# Patient Record
Sex: MALE | Race: BLACK OR AFRICAN AMERICAN | NOT HISPANIC OR LATINO | URBAN - METROPOLITAN AREA
[De-identification: names, ages, dates, MRNs, and addresses within clinical notes are randomized per-mention and may not be internally consistent; named-entity substitution may affect disease eponyms.]

---

## 2017-02-13 ENCOUNTER — HOSPITAL ENCOUNTER (EMERGENCY)
Facility: HOSPITAL | Age: 27
Discharge: HOME | End: 2017-02-13
Attending: EMERGENCY MEDICINE | Admitting: EMERGENCY MEDICINE

## 2017-02-13 ENCOUNTER — APPOINTMENT (OUTPATIENT)
Dept: GENERAL RADIOLOGY | Facility: HOSPITAL | Age: 27
End: 2017-02-13

## 2017-02-13 VITALS
HEART RATE: 74 BPM | TEMPERATURE: 97.6 F | RESPIRATION RATE: 16 BRPM | HEIGHT: 65 IN | OXYGEN SATURATION: 100 % | SYSTOLIC BLOOD PRESSURE: 134 MMHG | BODY MASS INDEX: 25.83 KG/M2 | WEIGHT: 155 LBS | DIASTOLIC BLOOD PRESSURE: 66 MMHG

## 2017-02-13 DIAGNOSIS — R07.89 ATYPICAL CHEST PAIN: Primary | ICD-10-CM

## 2017-02-13 LAB
ALBUMIN SERPL-MCNC: 4.3 G/DL (ref 3.5–5.2)
ALBUMIN/GLOB SERPL: 1.2 G/DL
ALP SERPL-CCNC: 111 U/L (ref 39–117)
ALT SERPL W P-5'-P-CCNC: 45 U/L (ref 1–41)
ANION GAP SERPL CALCULATED.3IONS-SCNC: 13.3 MMOL/L
AST SERPL-CCNC: 22 U/L (ref 1–40)
BASOPHILS # BLD AUTO: 0.01 10*3/MM3 (ref 0–0.2)
BASOPHILS NFR BLD AUTO: 0.2 % (ref 0–1.5)
BILIRUB SERPL-MCNC: 2.5 MG/DL (ref 0.1–1.2)
BUN BLD-MCNC: 14 MG/DL (ref 6–20)
BUN/CREAT SERPL: 9.7 (ref 7–25)
CALCIUM SPEC-SCNC: 9.6 MG/DL (ref 8.6–10.5)
CHLORIDE SERPL-SCNC: 101 MMOL/L (ref 98–107)
CO2 SERPL-SCNC: 25.7 MMOL/L (ref 22–29)
CREAT BLD-MCNC: 1.44 MG/DL (ref 0.76–1.27)
DEPRECATED RDW RBC AUTO: 38.6 FL (ref 37–54)
EOSINOPHIL # BLD AUTO: 0.01 10*3/MM3 (ref 0–0.7)
EOSINOPHIL NFR BLD AUTO: 0.2 % (ref 0.3–6.2)
ERYTHROCYTE [DISTWIDTH] IN BLOOD BY AUTOMATED COUNT: 12 % (ref 11.5–14.5)
GFR SERPL CREATININE-BSD FRML MDRD: 73 ML/MIN/1.73
GLOBULIN UR ELPH-MCNC: 3.5 GM/DL
GLUCOSE BLD-MCNC: 88 MG/DL (ref 65–99)
HCT VFR BLD AUTO: 44.6 % (ref 40.4–52.2)
HGB BLD-MCNC: 15.1 G/DL (ref 13.7–17.6)
IMM GRANULOCYTES # BLD: 0 10*3/MM3 (ref 0–0.03)
IMM GRANULOCYTES NFR BLD: 0 % (ref 0–0.5)
LIPASE SERPL-CCNC: 18 U/L (ref 13–60)
LYMPHOCYTES # BLD AUTO: 1.15 10*3/MM3 (ref 0.9–4.8)
LYMPHOCYTES NFR BLD AUTO: 23.4 % (ref 19.6–45.3)
MCH RBC QN AUTO: 29.3 PG (ref 27–32.7)
MCHC RBC AUTO-ENTMCNC: 33.9 G/DL (ref 32.6–36.4)
MCV RBC AUTO: 86.4 FL (ref 79.8–96.2)
MONOCYTES # BLD AUTO: 0.3 10*3/MM3 (ref 0.2–1.2)
MONOCYTES NFR BLD AUTO: 6.1 % (ref 5–12)
NEUTROPHILS # BLD AUTO: 3.45 10*3/MM3 (ref 1.9–8.1)
NEUTROPHILS NFR BLD AUTO: 70.1 % (ref 42.7–76)
PLATELET # BLD AUTO: 238 10*3/MM3 (ref 140–500)
PMV BLD AUTO: 10.2 FL (ref 6–12)
POTASSIUM BLD-SCNC: 3.9 MMOL/L (ref 3.5–5.2)
PROT SERPL-MCNC: 7.8 G/DL (ref 6–8.5)
RBC # BLD AUTO: 5.16 10*6/MM3 (ref 4.6–6)
SODIUM BLD-SCNC: 140 MMOL/L (ref 136–145)
TROPONIN T SERPL-MCNC: <0.01 NG/ML (ref 0–0.03)
WBC NRBC COR # BLD: 4.92 10*3/MM3 (ref 4.5–10.7)

## 2017-02-13 PROCEDURE — 83690 ASSAY OF LIPASE: CPT | Performed by: EMERGENCY MEDICINE

## 2017-02-13 PROCEDURE — 99283 EMERGENCY DEPT VISIT LOW MDM: CPT

## 2017-02-13 PROCEDURE — 85025 COMPLETE CBC W/AUTO DIFF WBC: CPT | Performed by: EMERGENCY MEDICINE

## 2017-02-13 PROCEDURE — 71020 HC CHEST PA AND LATERAL: CPT

## 2017-02-13 PROCEDURE — 93005 ELECTROCARDIOGRAM TRACING: CPT | Performed by: EMERGENCY MEDICINE

## 2017-02-13 PROCEDURE — 93010 ELECTROCARDIOGRAM REPORT: CPT | Performed by: INTERNAL MEDICINE

## 2017-02-13 PROCEDURE — 84484 ASSAY OF TROPONIN QUANT: CPT | Performed by: EMERGENCY MEDICINE

## 2017-02-13 PROCEDURE — 80053 COMPREHEN METABOLIC PANEL: CPT | Performed by: EMERGENCY MEDICINE

## 2017-02-13 RX ORDER — NAPROXEN 500 MG/1
500 TABLET ORAL 2 TIMES DAILY PRN
Qty: 20 TABLET | Refills: 0 | Status: SHIPPED | OUTPATIENT
Start: 2017-02-13 | End: 2022-05-31

## 2017-02-13 NOTE — ED PROVIDER NOTES
" EMERGENCY DEPARTMENT ENCOUNTER    CHIEF COMPLAINT  Chief Complaint: Chest Pain  History given by: Patient  History limited by: N/A  Room Number: 18/18  PMD: No Known Provider      HPI:  Pt is a 26 y.o. male who presents complaining of chest pain onset 0845 today. Pt states that the pain does not radiate. Pt describes the pain as \"pressure\". Pt reports SOA, epigastric abdominal pain, and nausea. Pt denies vomiting. Pt states that nothing worsens his pain or betters it. Pt denies any trauma.    Duration:  3 hours  Onset: Gradual  Timing: Constant  Location: Chest  Radiation: Does not radiate  Quality: \"pain\"  Intensity/Severity: Moderate  Progression: Worsening  Associated Symptoms: SOA, abdominal pain, nausea  Aggravating Factors: None  Alleviating Factors: None  Previous Episodes: None  Treatment before arrival: None    PAST MEDICAL HISTORY  Active Ambulatory Problems     Diagnosis Date Noted   • No Active Ambulatory Problems     Resolved Ambulatory Problems     Diagnosis Date Noted   • No Resolved Ambulatory Problems     No Additional Past Medical History       PAST SURGICAL HISTORY  History reviewed. No pertinent past surgical history.    FAMILY HISTORY  History reviewed. No pertinent family history.    SOCIAL HISTORY  Social History     Social History   • Marital status: Single     Spouse name: N/A   • Number of children: N/A   • Years of education: N/A     Occupational History   • Not on file.     Social History Main Topics   • Smoking status: Never Smoker   • Smokeless tobacco: Not on file   • Alcohol use Yes      Comment: socially   • Drug use: No   • Sexual activity: Not on file     Other Topics Concern   • Not on file     Social History Narrative   • No narrative on file       ALLERGIES  Review of patient's allergies indicates no known allergies.    REVIEW OF SYSTEMS  Review of Systems   Constitutional: Negative for chills and fever.   HENT: Negative for congestion and sore throat.    Respiratory: Positive " for shortness of breath. Negative for cough.    Cardiovascular: Positive for chest pain. Negative for leg swelling.   Gastrointestinal: Positive for abdominal pain (epigastic) and nausea. Negative for diarrhea and vomiting.   Genitourinary: Negative for decreased urine volume and dysuria.   Musculoskeletal: Negative for neck pain.   Skin: Negative for pallor and rash.   Neurological: Negative for weakness, numbness and headaches.   All other systems reviewed and are negative.      PHYSICAL EXAM  ED Triage Vitals   Temp Heart Rate Resp BP SpO2   02/13/17 0950 02/13/17 0950 02/13/17 1017 02/13/17 1017 02/13/17 0950   97.6 °F (36.4 °C) 82 16 118/63 100 %      Temp src Heart Rate Source Patient Position BP Location FiO2 (%)   02/13/17 0950 02/13/17 0950 -- -- --   Tympanic Monitor          Physical Exam   Constitutional: He is oriented to person, place, and time and well-developed, well-nourished, and in no distress.   HENT:   Head: Normocephalic and atraumatic.   Eyes: EOM are normal. Pupils are equal, round, and reactive to light.   Neck: Normal range of motion. Neck supple.   Cardiovascular: Normal rate, regular rhythm and normal heart sounds.    Pulmonary/Chest: Effort normal and breath sounds normal. No respiratory distress.   Abdominal: Soft. There is no tenderness. There is no rebound and no guarding.   Musculoskeletal: Normal range of motion. He exhibits no edema.   Neurological: He is alert and oriented to person, place, and time. He has normal sensation and normal strength.   Skin: Skin is warm and dry.   Psychiatric: Mood and affect normal.   Nursing note and vitals reviewed.      LAB RESULTS  Lab Results (last 24 hours)     Procedure Component Value Units Date/Time    Troponin [[id number]]  (Normal) Collected:  02/13/17 1145    Specimen:  Blood Updated:  02/13/17 1226     Troponin T <0.010 ng/mL     Narrative:       Troponin T Reference Ranges:  Less than 0.03 ng/mL:    Negative for AMI  0.03 to 0.09 ng/mL:       Indeterminant for AMI  Greater than 0.09 ng/mL: Positive for AMI    CBC & Differential [[id number]] Collected:  02/13/17 1145    Specimen:  Blood Updated:  02/13/17 1201    Narrative:       The following orders were created for panel order CBC & Differential.  Procedure                               Abnormality         Status                     ---------                               -----------         ------                     CBC Auto Differential[[id number]]         Abnormal            Final result                 Please view results for these tests on the individual orders.    Comprehensive Metabolic Panel [[id number]]  (Abnormal) Collected:  02/13/17 1145    Specimen:  Blood Updated:  02/13/17 1226     Glucose 88 mg/dL      BUN 14 mg/dL      Creatinine 1.44 (H) mg/dL      Sodium 140 mmol/L      Potassium 3.9 mmol/L      Chloride 101 mmol/L      CO2 25.7 mmol/L      Calcium 9.6 mg/dL      Total Protein 7.8 g/dL      Albumin 4.30 g/dL      ALT (SGPT) 45 (H) U/L      AST (SGOT) 22 U/L      Alkaline Phosphatase 111 U/L      Total Bilirubin 2.5 (H) mg/dL      eGFR  African Amer 73 mL/min/1.73      Globulin 3.5 gm/dL      A/G Ratio 1.2 g/dL      BUN/Creatinine Ratio 9.7      Anion Gap 13.3 mmol/L     Lipase [[id number]]  (Normal) Collected:  02/13/17 1145    Specimen:  Blood Updated:  02/13/17 1226     Lipase 18 U/L     CBC Auto Differential [[id number]]  (Abnormal) Collected:  02/13/17 1145    Specimen:  Blood Updated:  02/13/17 1201     WBC 4.92 10*3/mm3      RBC 5.16 10*6/mm3      Hemoglobin 15.1 g/dL      Hematocrit 44.6 %      MCV 86.4 fL      MCH 29.3 pg      MCHC 33.9 g/dL      RDW 12.0 %      RDW-SD 38.6 fl      MPV 10.2 fL      Platelets 238 10*3/mm3      Neutrophil % 70.1 %      Lymphocyte % 23.4 %      Monocyte % 6.1 %      Eosinophil % 0.2 (L) %      Basophil % 0.2 %      Immature Grans % 0.0 %      Neutrophils, Absolute 3.45 10*3/mm3      Lymphocytes, Absolute 1.15 10*3/mm3      Monocytes, Absolute 0.30  10*3/mm3      Eosinophils, Absolute 0.01 10*3/mm3      Basophils, Absolute 0.01 10*3/mm3      Immature Grans, Absolute 0.00 10*3/mm3           I ordered the above labs and reviewed the results.    RADIOLOGY  XR Chest 2 View   Final Result   Negative chest.       This report was finalized on 2/13/2017 12:43 PM by Dr. Renato Toussaint MD.               I ordered the above noted radiological studies. Interpreted by radiologist. Reviewed by me in PACS.     EKG         EKG time: 1148  Rhythm/Rate: NSR at 61 bpm  P waves and SC: Normal  QRS, axis: Normal  ST and T waves: Normal  Interpreted contemporaneously by me and independently viewed.  No prior for comparison.      PROCEDURES  Procedures      PROGRESS AND CONSULTS  ED Course     11:29 AM:  Vitals: BP: 130/76 HR: 57 Temp: 97.6 °F (36.4 °C) (Tympanic) O2 sat: 100%  D/w pt plan for CXR, EKG, and labs for further evaluation.    1:04 PM:  Vitals: BP: 130/76 HR: 57 Temp: 97.6 °F (36.4 °C) (Tympanic) O2 sat: 100%  Rechecked pt. Pt is resting comfortably. Discussed with pt normal test results and plan for discharge. Pt understands and agrees with the plan, all questions answered.      MEDICAL DECISION MAKING  Results were reviewed/discussed with the patient and they were also made aware of online access. Pt also made aware that some labs, such as cultures, will not be resulted during ER visit and follow up with PMD is necessary.     MDM  Number of Diagnoses or Management Options  Atypical chest pain:   Diagnosis management comments: Patient presented to the ER with atypical chest pain.  His EKG was normal.  Chest x-ray was normal.  Labs were unremarkable except for mildly elevated creatinine and bilirubin.  Patient is young and has no other medical problems.  He appeared comfortable in the ER.  On exam, the patient's abdomen was soft and nontender.       Amount and/or Complexity of Data Reviewed  Clinical lab tests: ordered and reviewed  Tests in the radiology section of  CPT®: ordered and reviewed  Tests in the medicine section of CPT®: ordered and reviewed           DIAGNOSIS  Final diagnoses:   Atypical chest pain       DISPOSITION  1312- DISCHARGE    Patient discharged in stable condition.    Reviewed implications of results, diagnosis, meds, responsibility to follow up, warning signs and symptoms of possible worsening, potential complications and reasons to return to ER.    Patient/Family voiced understanding of above instructions.    Discussed plan for discharge, as there is no emergent indication for admission.  Pt/family is agreeable and understands need for follow up and repeat testing.  Pt is aware that discharge does not mean that nothing is wrong but it indicates no emergency is present that requires admission and they must continue care with follow-up as given below or physician of their choice.     FOLLOW-UP  [facility] PHYSICAN REFERRAL SERVICE  [place] [id number] [phone]    As needed         Medication List      New Prescriptions          naproxen 500 MG tablet   Commonly known as:  NAPROSYN   Take 1 tablet by mouth 2 (Two) Times a Day As Needed for mild pain (1-3)   or moderate pain (4-6).           Latest Documented Vital Signs:  As of 5:09 PM  BP- 134/66 HR- 74 Temp- 97.6 °F (36.4 °C) (Tympanic) O2 sat- 100%    --  Documentation assistance provided by ceferino Trammell for Silver Porter MD.  Information recorded by the scribe was done at my direction and has been verified and validated by me.            Antoine Trammell  02/13/17 9090       Silver Porter MD  02/13/17 9916

## 2017-02-13 NOTE — DISCHARGE INSTRUCTIONS
Call [facility] medical [facility] to obtain a primary care physician for follow-up.  Return to the emergency department for worsening pain, trouble breathing, or other concern.

## 2018-04-25 PROCEDURE — 81001 URINALYSIS AUTO W/SCOPE: CPT | Performed by: NURSE PRACTITIONER

## 2018-04-25 PROCEDURE — 85025 COMPLETE CBC W/AUTO DIFF WBC: CPT | Performed by: NURSE PRACTITIONER

## 2018-04-25 PROCEDURE — 80061 LIPID PANEL: CPT | Performed by: NURSE PRACTITIONER

## 2018-04-25 PROCEDURE — 80053 COMPREHEN METABOLIC PANEL: CPT | Performed by: NURSE PRACTITIONER

## 2018-04-26 ENCOUNTER — LAB REQUISITION (OUTPATIENT)
Dept: LAB | Facility: OTHER | Age: 28
End: 2018-04-26

## 2018-04-26 DIAGNOSIS — Z00.00 ROUTINE ADULT HEALTH MAINTENANCE: ICD-10-CM

## 2018-04-26 LAB
ALBUMIN SERPL-MCNC: 4.5 G/DL (ref 3.5–5.2)
ALP SERPL-CCNC: 121 U/L (ref 39–117)
ALT SERPL W P-5'-P-CCNC: 50 U/L (ref 1–41)
AST SERPL-CCNC: 30 U/L (ref 1–40)
BACTERIA UR QL AUTO: ABNORMAL /HPF
BASOPHILS # BLD AUTO: 0.02 10*3/MM3 (ref 0–0.2)
BASOPHILS NFR BLD AUTO: 0.3 % (ref 0–1.5)
BILIRUB CONJ SERPL-MCNC: <0.2 MG/DL (ref 0–0.3)
BILIRUB SERPL-MCNC: 1.2 MG/DL (ref 0.1–1.2)
BILIRUB UR QL STRIP: NEGATIVE
BUN BLD-MCNC: 12 MG/DL (ref 6–20)
CALCIUM SPEC-SCNC: 10 MG/DL (ref 8.6–10.5)
CHLORIDE SERPL-SCNC: 99 MMOL/L (ref 98–107)
CHOLEST SERPL-MCNC: 248 MG/DL (ref 0–200)
CLARITY UR: ABNORMAL
CO2 SERPL-SCNC: 27.8 MMOL/L (ref 22–29)
COD CRY URNS QL: ABNORMAL /HPF
COLOR UR: YELLOW
CREAT BLD-MCNC: 1.41 MG/DL (ref 0.76–1.27)
DEPRECATED RDW RBC AUTO: 41.6 FL (ref 37–54)
EOSINOPHIL # BLD AUTO: 0.05 10*3/MM3 (ref 0–0.7)
EOSINOPHIL NFR BLD AUTO: 0.8 % (ref 0.3–6.2)
ERYTHROCYTE [DISTWIDTH] IN BLOOD BY AUTOMATED COUNT: 12.5 % (ref 11.5–14.5)
GFR SERPL CREATININE-BSD FRML MDRD: 61 ML/MIN/1.73
GFR SERPL CREATININE-BSD FRML MDRD: 74 ML/MIN/1.73
GGT SERPL-CCNC: 110 U/L (ref 8–61)
GLUCOSE BLD-MCNC: 83 MG/DL (ref 65–99)
GLUCOSE UR STRIP-MCNC: NEGATIVE MG/DL
HCT VFR BLD AUTO: 48.7 % (ref 40.4–52.2)
HDLC SERPL-MCNC: 62 MG/DL (ref 40–60)
HGB BLD-MCNC: 15.7 G/DL (ref 13.7–17.6)
HGB UR QL STRIP.AUTO: NEGATIVE
HYALINE CASTS UR QL AUTO: ABNORMAL /LPF
IMM GRANULOCYTES # BLD: 0 10*3/MM3 (ref 0–0.03)
IMM GRANULOCYTES NFR BLD: 0 % (ref 0–0.5)
IRON 24H UR-MRATE: 116 MCG/DL (ref 59–158)
KETONES UR QL STRIP: NEGATIVE
LDH SERPL-CCNC: 228 U/L (ref 135–225)
LDLC SERPL CALC-MCNC: 157 MG/DL (ref 0–100)
LDLC/HDLC SERPL: 2.53 {RATIO}
LEUKOCYTE ESTERASE UR QL STRIP.AUTO: ABNORMAL
LYMPHOCYTES # BLD AUTO: 2.45 10*3/MM3 (ref 0.9–4.8)
LYMPHOCYTES NFR BLD AUTO: 38.1 % (ref 19.6–45.3)
MCH RBC QN AUTO: 29.5 PG (ref 27–32.7)
MCHC RBC AUTO-ENTMCNC: 32.2 G/DL (ref 32.6–36.4)
MCV RBC AUTO: 91.5 FL (ref 79.8–96.2)
MONOCYTES # BLD AUTO: 0.64 10*3/MM3 (ref 0.2–1.2)
MONOCYTES NFR BLD AUTO: 10 % (ref 5–12)
NEUTROPHILS # BLD AUTO: 3.27 10*3/MM3 (ref 1.9–8.1)
NEUTROPHILS NFR BLD AUTO: 50.8 % (ref 42.7–76)
NITRITE UR QL STRIP: NEGATIVE
PH UR STRIP.AUTO: 6.5 [PH] (ref 5–8)
PHOSPHATE SERPL-MCNC: 3.7 MG/DL (ref 2.5–4.5)
PLATELET # BLD AUTO: 280 10*3/MM3 (ref 140–500)
PMV BLD AUTO: 11.2 FL (ref 6–12)
POTASSIUM BLD-SCNC: 3.7 MMOL/L (ref 3.5–5.2)
PROT SERPL-MCNC: 8.2 G/DL (ref 6–8.5)
PROT UR QL STRIP: NEGATIVE
RBC # BLD AUTO: 5.32 10*6/MM3 (ref 4.6–6)
RBC # UR: ABNORMAL /HPF
REF LAB TEST METHOD: ABNORMAL
SODIUM BLD-SCNC: 140 MMOL/L (ref 136–145)
SP GR UR STRIP: 1.02 (ref 1–1.03)
SQUAMOUS #/AREA URNS HPF: ABNORMAL /HPF
TRIGL SERPL-MCNC: 145 MG/DL (ref 0–150)
URATE SERPL-MCNC: 4.9 MG/DL (ref 3.4–7)
UROBILINOGEN UR QL STRIP: ABNORMAL
VLDLC SERPL-MCNC: 29 MG/DL (ref 5–40)
WBC NRBC COR # BLD: 6.43 10*3/MM3 (ref 4.5–10.7)
WBC UR QL AUTO: ABNORMAL /HPF

## 2022-05-31 ENCOUNTER — OFFICE VISIT (OUTPATIENT)
Dept: INTERNAL MEDICINE | Age: 32
End: 2022-05-31

## 2022-05-31 VITALS
WEIGHT: 176 LBS | TEMPERATURE: 97.2 F | OXYGEN SATURATION: 98 % | BODY MASS INDEX: 30.05 KG/M2 | HEIGHT: 64 IN | DIASTOLIC BLOOD PRESSURE: 38 MMHG | HEART RATE: 60 BPM | SYSTOLIC BLOOD PRESSURE: 92 MMHG | RESPIRATION RATE: 18 BRPM

## 2022-05-31 DIAGNOSIS — Z00.00 HEALTH CARE MAINTENANCE: Primary | ICD-10-CM

## 2022-05-31 DIAGNOSIS — F41.8 INSOMNIA SECONDARY TO DEPRESSION WITH ANXIETY: ICD-10-CM

## 2022-05-31 DIAGNOSIS — F51.05 INSOMNIA SECONDARY TO DEPRESSION WITH ANXIETY: ICD-10-CM

## 2022-05-31 PROCEDURE — 99204 OFFICE O/P NEW MOD 45 MIN: CPT | Performed by: NURSE PRACTITIONER

## 2022-05-31 PROCEDURE — 99385 PREV VISIT NEW AGE 18-39: CPT | Performed by: NURSE PRACTITIONER

## 2022-05-31 RX ORDER — ESCITALOPRAM OXALATE 5 MG/1
5 TABLET ORAL DAILY
Qty: 30 TABLET | Refills: 0 | Status: SHIPPED | OUTPATIENT
Start: 2022-05-31 | End: 2022-06-27

## 2022-05-31 NOTE — PROGRESS NOTES
"    I N T E R N A L  M E D I C I N E  Lubna Cruz, APRN       ENCOUNTER DATE:  05/31/2022    Brian Nathen / 32 y.o. / male    CHIEF COMPLAINT     Establish Care      VITALS     Vitals:    05/31/22 1348   BP: (!) 92/38   Pulse: 60   Resp: 18   Temp: 97.2 °F (36.2 °C)   SpO2: 98%   Weight: 79.8 kg (176 lb)   Height: 162.6 cm (64\")       BP Readings from Last 3 Encounters:   05/31/22 (!) 92/38   02/13/17 134/66     Wt Readings from Last 3 Encounters:   05/31/22 79.8 kg (176 lb)   02/13/17 70.3 kg (155 lb)      Body mass index is 30.21 kg/m².    Blood pressure readings recorded on patient flowsheet:  No flowsheet data found.     MEDICATIONS     Current Outpatient Medications on File Prior to Visit   Medication Sig Dispense Refill   • [DISCONTINUED] naproxen (NAPROSYN) 500 MG tablet Take 1 tablet by mouth 2 (Two) Times a Day As Needed for mild pain (1-3) or moderate pain (4-6). 20 tablet 0     No current facility-administered medications on file prior to visit.         HISTORY OF PRESENT ILLNESS      Brian is a 32 year old male whom presents for annual health maintenance visit and to establish care with New PCP.     · General health: excellent  · Lifestyle:  · Attempting to lose weight?: No   · Diet: eats a well balanced, healthy diet  · Exercise: very active at work/home  · Tobacco: Never used   · Alcohol: does not drink  · Work: Full-time  · Reproductive health:  · Sexually active?: No   · Concern for STD?: No   · Sexual problems?: No problems   · Sees Urologist?: No   · Depression Screening:      PHQ-2/PHQ-9 Depression Screening 5/31/2022   Little Interest or Pleasure in Doing Things 0-->not at all   Feeling Down, Depressed or Hopeless 2-->more than half the days   Trouble Falling or Staying Asleep, or Sleeping Too Much 2-->more than half the days   Feeling Tired or Having Little Energy 3-->nearly every day   Poor Appetite or Overeating 2-->more than half the days   Feeling Bad about Yourself - or that You are a " Failure or Have Let Yourself or Your Family Down 2-->more than half the days   Trouble Concentrating on Things, Such as Reading the Newspaper or Watching Television 2-->more than half the days   Moving or Speaking So Slowly that Other People Could Have Noticed? Or the Opposite - Being So Fidgety 0-->not at all   Thoughts that You Would be Better Off Dead or of Hurting Yourself in Some Way 0-->not at all   PHQ-9: Brief Depression Severity Measure Score 13   If You Checked Off Any Problems, How Difficult Have These Problems Made It For You to Do Your Work, Take Care of Things at Home, or Get Along with Other People? somewhat difficult         PHQ-2: 3 (Proceed to PHQ-9)     PHQ-9: 4-7 (Moderate Depression)    Patient Care Team:  Lubna Cruz APRN as PCP - General (Family Medicine)  ______________________________________________________________________    ALLERGIES  No Known Allergies     PFSH:     The following portions of the patient's history were reviewed and updated as appropriate: Allergies / Current Medications / Past Medical History / Surgical History / Social History / Family History    PROBLEM LIST   Patient Active Problem List   Diagnosis   • Health care maintenance   • Insomnia secondary to depression with anxiety       PAST MEDICAL HISTORY  History reviewed. No pertinent past medical history.    SURGICAL HISTORY  History reviewed. No pertinent surgical history.    SOCIAL HISTORY  Social History     Socioeconomic History   • Marital status:    Tobacco Use   • Smoking status: Never Smoker   • Smokeless tobacco: Never Used   • Tobacco comment: Hookah   Vaping Use   • Vaping Use: Some days   Substance and Sexual Activity   • Alcohol use: Not Currently     Comment: socially   • Drug use: Never   • Sexual activity: Yes     Partners: Female       FAMILY HISTORY  Family History   Problem Relation Age of Onset   • Kidney disease Mother         Past from chronic Kidney Disease   • Anxiety disorder Sister     • Depression Sister        IMMUNIZATION HISTORY    There is no immunization history on file for this patient.      REVIEW OF SYSTEMS     Review of Systems   Constitutional: Negative.    HENT: Negative.    Eyes: Negative.    Respiratory: Negative.    Cardiovascular: Negative.    Gastrointestinal: Negative.    Genitourinary: Negative.    Musculoskeletal: Negative.    Skin: Negative.    Allergic/Immunologic: Negative.    Neurological: Negative.    Hematological: Negative.    Psychiatric/Behavioral: The patient is nervous/anxious.         States not sleeping and yelling at his spouse       PHYSICAL EXAMINATION     Physical Exam  Vitals reviewed.   Constitutional:       Appearance: Normal appearance. He is normal weight.   HENT:      Head: Normocephalic and atraumatic.      Right Ear: Tympanic membrane normal.      Left Ear: Tympanic membrane normal.      Nose: Nose normal.      Mouth/Throat:      Mouth: Mucous membranes are moist.   Eyes:      Extraocular Movements: Extraocular movements intact.      Pupils: Pupils are equal, round, and reactive to light.   Cardiovascular:      Rate and Rhythm: Normal rate and regular rhythm.      Pulses: Normal pulses.      Heart sounds: Normal heart sounds.   Pulmonary:      Effort: Pulmonary effort is normal.      Breath sounds: Normal breath sounds.   Abdominal:      General: Abdomen is flat. Bowel sounds are normal.   Musculoskeletal:         General: Normal range of motion.      Cervical back: Normal range of motion and neck supple.   Skin:     General: Skin is warm and dry.      Capillary Refill: Capillary refill takes less than 2 seconds.   Neurological:      Mental Status: He is alert and oriented to person, place, and time.   Psychiatric:         Mood and Affect: Mood normal.         Thought Content: Thought content normal.         Judgment: Judgment normal.         REVIEWED DATA      Labs:    Lab Results   Component Value Date     04/25/2018    K 3.7 04/25/2018     CALCIUM 10.0 04/25/2018    AST 30 04/25/2018    ALT 50 (H) 04/25/2018    BUN 12 04/25/2018    CREATININE 1.41 (H) 04/25/2018    CREATININE 1.44 (H) 02/13/2017    EGFRIFNONA 61 04/25/2018    EGFRIFAFRI 74 04/25/2018       Lab Results   Component Value Date    GLUCOSE 83 04/25/2018       No results found for: PSA    No results found for: TESTOSTERONE, TESTOSTEROTT, TESTFRE    Lab Results   Component Value Date     (H) 04/25/2018    HDL 62 (H) 04/25/2018    TRIG 145 04/25/2018       No components found for: [id number]    Lab Results   Component Value Date    WBC 6.43 04/25/2018    HGB 15.7 04/25/2018    MCV 91.5 04/25/2018     04/25/2018       Lab Results   Component Value Date    GLUCOSEU Negative 04/25/2018    BLOODU Negative 04/25/2018    NITRITEU Negative 04/25/2018    LEUKOCYTESUR Trace (A) 04/25/2018        No results found for: HEPCVIRUSABY    Imaging:           Medical Tests:           ASSESSMENT & PLAN     ANNUAL WELLNESS EXAM / PHYSICAL     Other medical problems addressed today:  Problem List Items Addressed This Visit        Health Encounters    Health care maintenance - Primary    Relevant Orders    Comprehensive metabolic panel    Lipid panel    CBC w AUTO Differential       Mental Health    Insomnia secondary to depression with anxiety    Relevant Medications    escitalopram (Lexapro) 5 MG tablet          Summary/Discussion:     · BP low when first arrived, re-assessed and 112/58. Encouraged at least 64 ounces of water daily. Negative for dizziness, chest pain, or palpations.   · Discussion of  Insomnia with Anxiety/depression and Rx Lexapro 5 mg to pharmacy. States not sleeping but 4 hrs nightly and yelling at spouse. Discussed diagnosis of depression, antidepressant medications including information on side effects, adverse effects and discussed need to monitor for worsening depression, anxiety/irritability, suicidal thoughts/ideations, or significant mood swings. Patient expressed  understanding and agreed to follow above instructions.  · Follow up in 3 months for Anxiety/Depression   · Labs ordered for future visit as was not fasting today   · I spent 30 min in direct care of this patient on this date of service. This time includes times spent by me in the following activities: Preparing for the visit, obtaining and/or reviewing a separately obtained history, performing a medically appropriate examination and/or evaluation, reviewing medical records, reviewing tests, ordering medications, tests, or procedures, counseling and educating the patient, documenting information in the medical record and reviewing office note/correspondence from other providers.       Return in about 3 months (around 8/31/2022) for Next scheduled follow up.      HEALTHCARE MAINTENANCE ISSUES       Cancer Screening:  · Colon: Initial/Next screening at age: 45  · Repeat colon cancer screening: N/A at this time  · Prostate: Start screening at 45 then annually  · Testicular: Recommended monthly self exam  · Skin: Monthly self skin examination, annual exam by health professional  · Lung: Does not meet criteria for lung cancer screening.   · Other:    Screening Labs & Tests:  · Lab results reviewed & discussed with with patient or orders placed today.  · EKG:  · CV Screening: No special screening needed  · DEXA (75+ or risk factors):   · HEP C (If born [id number] or risk factors): Not indicated  · Other:     Immunization/Vaccinations (to be given today unless deferred by patient)  · Influenza: Recommended annual influenza vaccine  · Hepatitis A: Verify immunization records  · Hepatitis B: Verify immunization records  · Tetanus/Pertussis: Up to date  · Pneumovax/PCV: Not needed at this time  · Shingles: Not needed at this time  · COVID: LORENZO LEHMAN COVID: Plans to not take the vaccine  Lifestyle Counseling:  · Lifestyle Modifications: Continue good lifestyle choices/modifications, Reduce exposure to stress if possible and  Discussed better management of stress/anxiety  · Safety Issues: Always wear seatbelt, Avoid texting while driving   · Use sunscreen, regular skin examination  · Recommended annual dental/vision examination.  · Emotional/Stress/Sleep: Reviewed and  given when appropriate      Health Maintenance   Topic Date Due   • ANNUAL PHYSICAL  Never done   • COVID-19 Vaccine (1) Never done   • TDAP/TD VACCINES (1 - Tdap) Never done   • HEPATITIS C SCREENING  Never done   • INFLUENZA VACCINE  08/01/2022   • Pneumococcal Vaccine 0-64  Aged Out           *Examiner was wearing mask protection during the entire duration of the visit. Patient was masked the entire time. Minimum social distance of 6 ft maintained entire visit except if physical contact was necessary as documented.       Template created by HAYDEN Ny

## 2022-06-01 ENCOUNTER — PATIENT ROUNDING (BHMG ONLY) (OUTPATIENT)
Dept: INTERNAL MEDICINE | Age: 32
End: 2022-06-01

## 2022-06-01 NOTE — PROGRESS NOTES
A My-Chart message has been sent to the patient for PATIENT ROUNDING with [facility]

## 2022-06-27 RX ORDER — ESCITALOPRAM OXALATE 5 MG/1
TABLET ORAL
Qty: 90 TABLET | Refills: 1 | Status: SHIPPED | OUTPATIENT
Start: 2022-06-27

## 2022-07-22 DIAGNOSIS — Z00.00 ANNUAL PHYSICAL EXAM: Primary | ICD-10-CM

## 2022-07-22 DIAGNOSIS — Z11.59 NEED FOR HEPATITIS C SCREENING TEST: ICD-10-CM

## 2022-07-27 ENCOUNTER — OFFICE VISIT (OUTPATIENT)
Dept: INTERNAL MEDICINE | Age: 32
End: 2022-07-27

## 2022-07-27 VITALS
WEIGHT: 178 LBS | OXYGEN SATURATION: 99 % | TEMPERATURE: 97.3 F | HEART RATE: 69 BPM | HEIGHT: 64 IN | DIASTOLIC BLOOD PRESSURE: 80 MMHG | BODY MASS INDEX: 30.39 KG/M2 | SYSTOLIC BLOOD PRESSURE: 120 MMHG

## 2022-07-27 DIAGNOSIS — Z23 NEED FOR TDAP VACCINATION: ICD-10-CM

## 2022-07-27 DIAGNOSIS — F51.05 INSOMNIA SECONDARY TO DEPRESSION WITH ANXIETY: Primary | ICD-10-CM

## 2022-07-27 DIAGNOSIS — F41.8 INSOMNIA SECONDARY TO DEPRESSION WITH ANXIETY: Primary | ICD-10-CM

## 2022-07-27 DIAGNOSIS — Z00.00 HEALTH CARE MAINTENANCE: ICD-10-CM

## 2022-07-27 PROCEDURE — 90715 TDAP VACCINE 7 YRS/> IM: CPT | Performed by: NURSE PRACTITIONER

## 2022-07-27 PROCEDURE — 90471 IMMUNIZATION ADMIN: CPT | Performed by: NURSE PRACTITIONER

## 2022-07-27 PROCEDURE — 99395 PREV VISIT EST AGE 18-39: CPT | Performed by: NURSE PRACTITIONER

## 2022-07-27 NOTE — PROGRESS NOTES
"    I N T E R N A L  M E D I C I N E  Lubna Cruz, APRN       ENCOUNTER DATE:  07/27/2022    Brian Nathen / 32 y.o. / male    CHIEF COMPLAINT     Annual Exam, Follow up BP and anxiety/depression      VITALS     Vitals:    07/27/22 0929   BP: 120/80   Pulse: 69   Temp: 97.3 °F (36.3 °C)   TempSrc: Temporal   SpO2: 99%   Weight: 80.7 kg (178 lb)   Height: 162.6 cm (64.02\")       BP Readings from Last 3 Encounters:   07/27/22 120/80   05/31/22 (!) 92/38   02/13/17 134/66     Wt Readings from Last 3 Encounters:   07/27/22 80.7 kg (178 lb)   05/31/22 79.8 kg (176 lb)   02/13/17 70.3 kg (155 lb)      Body mass index is 30.54 kg/m².    Blood pressure readings recorded on patient flowsheet:  No flowsheet data found.     MEDICATIONS     Current Outpatient Medications on File Prior to Visit   Medication Sig Dispense Refill   • escitalopram (LEXAPRO) 5 MG tablet TAKE 1 TABLET BY MOUTH DAILY 90 tablet 1     No current facility-administered medications on file prior to visit.         HISTORY OF PRESENT ILLNESS      Brian presents for annual health maintenance visit.    · General health: excellent  · Lifestyle:  · Attempting to lose weight?: No   · Diet: eats decently  · Exercise: exercises 3 days/week  · Tobacco: Never used   · Alcohol: occasional/infrequent  · Work: Full-time  · Reproductive health:  · Sexually active?: Yes   · Concern for STD?: No   · Sexual problems?: No problems   · Sees Urologist?: No   · Depression Screening:      PHQ-2/PHQ-9 Depression Screening 5/31/2022   Little Interest or Pleasure in Doing Things 0-->not at all   Feeling Down, Depressed or Hopeless 2-->more than half the days   Trouble Falling or Staying Asleep, or Sleeping Too Much 2-->more than half the days   Feeling Tired or Having Little Energy 3-->nearly every day   Poor Appetite or Overeating 2-->more than half the days   Feeling Bad about Yourself - or that You are a Failure or Have Let Yourself or Your Family Down 2-->more than half the days "   Trouble Concentrating on Things, Such as Reading the Newspaper or Watching Television 2-->more than half the days   Moving or Speaking So Slowly that Other People Could Have Noticed? Or the Opposite - Being So Fidgety 0-->not at all   Thoughts that You Would be Better Off Dead or of Hurting Yourself in Some Way 0-->not at all   PHQ-9: Brief Depression Severity Measure Score 13   If You Checked Off Any Problems, How Difficult Have These Problems Made It For You to Do Your Work, Take Care of Things at Home, or Get Along with Other People? somewhat difficult         PHQ-2: 0 (Not depressed)     PHQ-9: 5-9 (Moderate Depression)    Patient Care Team:  Lubna Cruz APRN as PCP - General (Family Medicine)  ______________________________________________________________________    ALLERGIES  No Known Allergies     PFSH:     The following portions of the patient's history were reviewed and updated as appropriate: Allergies / Current Medications / Past Medical History / Surgical History / Social History / Family History    PROBLEM LIST   Patient Active Problem List   Diagnosis   • Health care maintenance-baseline labs ordered   • Insomnia secondary to depression with anxiety   Continue Lexapro 5 mg daily    • Need for Tdap vaccination- administered in office       PAST MEDICAL HISTORY  Past Medical History:   Diagnosis Date   • Anxiety        SURGICAL HISTORY  History reviewed. No pertinent surgical history.    SOCIAL HISTORY  Social History     Socioeconomic History   • Marital status:    Tobacco Use   • Smoking status: Never Smoker   • Smokeless tobacco: Never Used   • Tobacco comment: Hookah   Vaping Use   • Vaping Use: Some days   Substance and Sexual Activity   • Alcohol use: Not Currently     Comment: socially   • Drug use: Never   • Sexual activity: Yes     Partners: Female       FAMILY HISTORY  Family History   Problem Relation Age of Onset   • Kidney disease Mother         Past from chronic Kidney Disease    • Anxiety disorder Sister    • Depression Sister        IMMUNIZATION HISTORY  Immunization History   Administered Date(s) Administered   • Hep A, 2 Dose 08/28/2009   • Tdap 07/27/2022         REVIEW OF SYSTEMS     Review of Systems   Constitutional: Negative.    HENT: Negative.    Eyes: Negative.    Respiratory: Negative.    Cardiovascular: Negative.    Gastrointestinal: Negative.    Genitourinary: Negative.    Musculoskeletal: Negative.    Skin: Negative.    Allergic/Immunologic: Negative.    Neurological: Negative.    Hematological: Negative.    Psychiatric/Behavioral: Negative.        PHYSICAL EXAMINATION     Physical Exam  Constitutional:       Appearance: Normal appearance.   HENT:      Head: Normocephalic.      Right Ear: Tympanic membrane normal. There is impacted cerumen.      Left Ear: Tympanic membrane normal. There is impacted cerumen.      Nose: Nose normal.      Mouth/Throat:      Mouth: Mucous membranes are moist.      Pharynx: Oropharynx is clear.   Eyes:      Extraocular Movements: Extraocular movements intact.      Pupils: Pupils are equal, round, and reactive to light.   Cardiovascular:      Rate and Rhythm: Normal rate and regular rhythm.      Pulses: Normal pulses.      Heart sounds: Normal heart sounds.   Pulmonary:      Effort: Pulmonary effort is normal.      Breath sounds: Normal breath sounds.   Abdominal:      General: Bowel sounds are normal.      Palpations: Abdomen is soft.   Musculoskeletal:         General: Normal range of motion.      Cervical back: Normal range of motion and neck supple.   Skin:     General: Skin is warm and dry.      Capillary Refill: Capillary refill takes less than 2 seconds.   Neurological:      Mental Status: He is alert and oriented to person, place, and time.   Psychiatric:         Mood and Affect: Mood normal.         REVIEWED DATA      Labs:    Lab Results   Component Value Date     04/25/2018    K 3.7 04/25/2018    CALCIUM 10.0 04/25/2018    AST 30  04/25/2018    ALT 50 (H) 04/25/2018    BUN 12 04/25/2018    CREATININE 1.41 (H) 04/25/2018    CREATININE 1.44 (H) 02/13/2017    EGFRIFNONA 61 04/25/2018    EGFRIFAFRI 74 04/25/2018       Lab Results   Component Value Date    GLUCOSE 83 04/25/2018       No results found for: PSA    No results found for: TESTOSTERONE, TESTOSTEROTT, TESTFRE    Lab Results   Component Value Date     (H) 04/25/2018    HDL 62 (H) 04/25/2018    TRIG 145 04/25/2018       No components found for: [id number]    Lab Results   Component Value Date    WBC 6.43 04/25/2018    HGB 15.7 04/25/2018    MCV 91.5 04/25/2018     04/25/2018       Lab Results   Component Value Date    GLUCOSEU Negative 04/25/2018    BLOODU Negative 04/25/2018    NITRITEU Negative 04/25/2018    LEUKOCYTESUR Trace (A) 04/25/2018        No results found for: HEPCVIRUSABY    Imaging:           Medical Tests:           ASSESSMENT & PLAN     ANNUAL WELLNESS EXAM / PHYSICAL     Other medical problems addressed today:  Problem List Items Addressed This Visit        Health Encounters    Health care maintenance    Relevant Orders    TSH    CBC w AUTO Differential    Comprehensive metabolic panel    Lipid panel    Hemoglobin A1c       Infectious Diseases    Need for Tdap vaccination    Relevant Orders    Tdap Vaccine Greater Than or Equal To 8yo IM (Completed)       Mental Health    Insomnia secondary to depression with anxiety - Primary    Relevant Medications    escitalopram (LEXAPRO) 5 MG tablet          Summary/Discussion:     · Discussion of Anxiety/Depression- continues on Lexapro. States improvement. Discussion of Behavioral health and declines at this time  · Patient with cerumen in both ears- instructed to get some OTC ear wax removal drops and use as directed  · I spent 25 min in direct care of this patient on this date of service. This time includes times spent by me in the following activities: Preparing for the visit, obtaining and/or reviewing a separately  obtained history, performing a medically appropriate examination and/or evaluation, reviewing medical records, reviewing tests, ordering medications, tests, or procedures, counseling and educating the patient, documenting information in the medical record and reviewing office note/correspondence from other providers.     Return in about 6 months (around 1/27/2023) for Next scheduled follow up.      HEALTHCARE MAINTENANCE ISSUES       Cancer Screening:  · Colon: Initial/Next screening at age: 47  · Repeat colon cancer screening: N/A at this time  · Prostate: Start screening at 50 then annually  · Testicular: Recommended monthly self exam  · Skin: Monthly self skin examination, annual exam by health professional  · Lung: Does not meet criteria for lung cancer screening.   · Other:    Screening Labs & Tests:  · Lab results reviewed & discussed with with patient or orders placed today.  · EKG:  · CV Screening: No special screening needed  · DEXA (75+ or risk factors):   · HEP C (If born [id number] or risk factors): Not indicated  · Other:     Immunization/Vaccinations (to be given today unless deferred by patient)  · Influenza: Recommended annual influenza vaccine  · Hepatitis A: Declined by patient  · Hepatitis B: Declined by patient  · Tetanus/Pertussis: Administer today  · Pneumovax/PCV: Not needed at this time  · Shingles: Not needed at this time  · COVID: Plans to not take the vaccine  Lifestyle Counseling:  · Lifestyle Modifications: Continue good lifestyle choices/modifications  · Safety Issues: Always wear seatbelt, Avoid texting while driving   · Use sunscreen, regular skin examination  · Recommended annual dental/vision examination.  · Emotional/Stress/Sleep: Reviewed and  given when appropriate      Health Maintenance   Topic Date Due   • COVID-19 Vaccine (1) Never done   • HEPATITIS C SCREENING  Never done   • INFLUENZA VACCINE  10/01/2022   • ANNUAL PHYSICAL  07/28/2023   • TDAP/TD VACCINES (2 - Td or  Tdap) 07/27/2032   • Pneumococcal Vaccine 0-64  Aged Out           *Examiner was wearing KN95 mask and eye protection during the entire duration of the visit. Patient was masked the entire time. Minimum social distance of 6 ft maintained entire visit except if physical contact was necessary as documented.       Template created by [employer]